# Patient Record
Sex: FEMALE | Race: WHITE | Employment: FULL TIME | URBAN - METROPOLITAN AREA
[De-identification: names, ages, dates, MRNs, and addresses within clinical notes are randomized per-mention and may not be internally consistent; named-entity substitution may affect disease eponyms.]

---

## 2022-11-15 ENCOUNTER — OFFICE VISIT (OUTPATIENT)
Dept: PRIMARY CARE CLINIC | Age: 37
End: 2022-11-15
Payer: COMMERCIAL

## 2022-11-15 VITALS
DIASTOLIC BLOOD PRESSURE: 76 MMHG | HEIGHT: 60 IN | SYSTOLIC BLOOD PRESSURE: 120 MMHG | TEMPERATURE: 98 F | BODY MASS INDEX: 26.7 KG/M2 | OXYGEN SATURATION: 98 % | HEART RATE: 70 BPM | WEIGHT: 136 LBS

## 2022-11-15 DIAGNOSIS — Z00.00 HEALTH MAINTENANCE EXAMINATION: Primary | ICD-10-CM

## 2022-11-15 DIAGNOSIS — D49.3 BREAST NEOPLASM: ICD-10-CM

## 2022-11-15 DIAGNOSIS — L50.3 DERMATOGRAPHIA: ICD-10-CM

## 2022-11-15 DIAGNOSIS — F41.9 ANXIETY: ICD-10-CM

## 2022-11-15 DIAGNOSIS — R06.02 SHORTNESS OF BREATH: ICD-10-CM

## 2022-11-15 DIAGNOSIS — R10.13 DYSPEPSIA: ICD-10-CM

## 2022-11-15 DIAGNOSIS — D35.2 PROLACTINOMA (HCC): ICD-10-CM

## 2022-11-15 PROCEDURE — 99214 OFFICE O/P EST MOD 30 MIN: CPT | Performed by: FAMILY MEDICINE

## 2022-11-15 PROCEDURE — 99395 PREV VISIT EST AGE 18-39: CPT | Performed by: FAMILY MEDICINE

## 2022-11-15 RX ORDER — ALBUTEROL SULFATE 90 UG/1
AEROSOL, METERED RESPIRATORY (INHALATION)
Qty: 18 G | Refills: 0 | Status: SHIPPED | OUTPATIENT
Start: 2022-11-15

## 2022-11-15 ASSESSMENT — PATIENT HEALTH QUESTIONNAIRE - PHQ9
2. FEELING DOWN, DEPRESSED OR HOPELESS: 0
1. LITTLE INTEREST OR PLEASURE IN DOING THINGS: 0
SUM OF ALL RESPONSES TO PHQ QUESTIONS 1-9: 0
SUM OF ALL RESPONSES TO PHQ QUESTIONS 1-9: 0
SUM OF ALL RESPONSES TO PHQ9 QUESTIONS 1 & 2: 0
SUM OF ALL RESPONSES TO PHQ QUESTIONS 1-9: 0
SUM OF ALL RESPONSES TO PHQ QUESTIONS 1-9: 0

## 2022-11-15 NOTE — PROGRESS NOTES
Cherri Villagomez : 1985 Sex: female  Age: 44 y.o. Chief Complaint   Patient presents with    Annual Exam       HPI  HPI:     Donnie Page presents today for annual physical and multiple other things she would like to discuss, last seen many years ago, greater than 3. She does follow with her endocrinologist regarding her prolactinoma. Had blood work in regard to this recently. Generally feels well. Brings up intermittent symptoms of what sounds like dermatographia. Chronic nasal congestion. About twice a month gets an acid feeling in her stomach that makes sleep difficult. Chronic anxiety but does not wish intervention. Functions very well, absolutely no SI/HI. Not in favor of medications in general.  Also says every so often she feels like she needs to catch her breath. History of early asthma. Extensive cardiac work-up through [email or web address] was negative and defers other cardiac testing including EKG but would like chest x-ray. Limitations this compared to advanced imaging reviewed and also discussed role of PFTs etc.  Would like a trial inhaler. She will get blood work and check with insurance first.  Online doctor prescribed what sounds like a high potency topical ointment for the dermatographia that does work, but we did emphasize importance of using it no more than 2 weeks with at least 1 week off on very small areas. Also history of multiple breast tumors that she states were benign, does not want mammogram, but would like follow-up ultrasound at [facility]. Used to see Dr. Bogdan Amezquita, needs new gynecologist encouraged her to follow-up ASAP. Also gas buildup at times. Health Maintenance:  Proper diet reviewed including Mediterranean and DASH diets. Counseled on healthy weight, appropriate exercise, avoidance of tobacco, and recommendations for minimal to no alcohol consumption. Counseled on the potential pros and cons of vitamins and supplements.   Reviewed the recommendations and risk/benefits of vaccines including phizer x 2; Td/Tdap (Declines) ,  pneumovax,  prevnar 13 , flu vaccine  (Declines) , Hepatitis vaccines, gardasil (Declines) , and shingrix (patient had chicken pox). HIV and Hep C screening guidelines were reviewed. Importance of regular eye and dental exams and health reviewed. Risks/Benefits of ASA reviewed and discussed latest guidelines. Sun protection reviewed. Notify if any new or changing moles/skin lesions, etc. Dexa Scan indications/ risk factors for osteoporotic fractures and prevention reviewed. Colonoscopy recommendations reviewed. Pt denies change in bowel habits or [address] of colon polyps/CA. Fit test discussed    Indications for EKG and  for additional  cardiac testing including referrals, stress testing,  2d echo, ect. dasx. Reviewed indications for other testing such as  PFT's.and  indications for imaging including brain, carotid, chest, abdominal, aortic .  dasx. Counseled on instructions regarding, and importance of monthly breast exams, yearly physician exams (sooner if sx's). Indications for mammograms reviewed and importance. Dexa Scan indications/ risk factors for osteoporotic fractures (and associated M/M) and preventative measures reviewed. Importance of regular GYN exams reviewed and pt to follow here or with her gynecologist as directed. Behind on GYN, encouraged      Review of Systems  ROS:  Const: Denies chills, fever, malaise and sweats. Eyes: Denies discharge, pain, redness and visual disturbance. ENMT: Denies earaches, other ear symptoms. Denies nasal or sinus symptoms other than stated  above. Denies mouth and tongue lesions and sore throat. CV: Denies chest discomfort, pain; diaphoresis, dizziness, edema, lightheadedness, orthopnea,  palpitations, syncope and near syncopal episode or any exertional symptoms  Resp: Denies cough, hemoptysis, pleuritic pain, SOB, sputum production and wheezing.   GI: Denies abdominal pain, change in bowel habits, hematochezia, melena, nausea and vomiting. Other than as above  : Denies urinary symptoms including dysuria , urgency, frequency or hematuria. Musculo: Denies musculoskeletal symptoms. Skin: Denies bruising and rash. Other than as above  Neuro: Denies headache, numbness, stiff neck, tingling and focal weakness slurred speech or facial  droop  Hema/Lymph: Denies bleeding/bruising tendency and enlarged lymph nodes        Current Outpatient Medications:     albuterol sulfate HFA (PROVENTIL;VENTOLIN;PROAIR) 108 (90 Base) MCG/ACT inhaler, 1-2 puffs q4-6 hrs prn, Disp: 18 g, Rfl: 0    cabergoline (DOSTINEX) 0.5 MG tablet, Take 0.25 mg by mouth twice a week., Disp: , Rfl:   Allergies   Allergen Reactions    Bactrim Nausea And Vomiting    Pcn [Penicillins] Rash       Past Medical History:   Diagnosis Date    Prolactinoma ([facility] [address]      Past Surgical History:   Procedure Laterality Date    COLONOSCOPY      HYSTERECTOMY, TOTAL ABDOMINAL (CERVIX REMOVED)      ovaries remain    SALPINGECTOMY      SHOULDER SURGERY      UPPER GASTROINTESTINAL ENDOSCOPY  04/23/2012    : EGD/COLONOSCOPY BOTH NORMAL    WISDOM TOOTH EXTRACTION       Family History   Problem Relation Age of Onset    Other Mother         mental health    Other Father         lives [place], alcoholic     Social History     Tobacco Use    Smoking status: Never    Smokeless tobacco: Never   Substance Use Topics    Alcohol use: No    Drug use: Yes     Types: Marijuana Destinee Coad)      Social History     Social History Narrative    Single, no kids        Works [employer]        Vitals:    11/15/22 0814   BP: 120/76   Pulse: 70   Temp: 98 °F (36.7 °C)   SpO2: 98%   Weight: 136 lb (61.7 kg)   Height: 5' (1.524 m)      Wt Readings from Last 3 Encounters:   11/15/22 136 lb (61.7 kg)   11/20/16 125 lb (56.7 kg)   08/02/16 125 lb (56.7 kg)        Physical Exam  Exam:  Const: Appears comfortable. No signs of acute distress present.   Head/Face: Atraumatic on inspection. Eyes: EOMI in both eyes. No discharge from the eyes. PERRL. Sclerae clear. ENMT: Auditory canals normal. Tympanic membranes: intact and translucent. External nose WNL. Nasal mucosa is clear. Oropharynx: No erythema or exudate. Posterior pharynx is normal.  Neck: Supple. Palpation reveals no adenopathy. No masses appreciated. No JVD. Carotids: no  bruits. Resp: Respirations are unlabored. Clear to auscultation. No rales, rhonchi or wheezes appreciated  over the lungs bilaterally. CV: Rate is regular. Rhythm is regular. No gallop or rubs. No heart murmur appreciated. Extremities: No clubbing, cyanosis, or edema. No calf inflammation or tenderness. Abdomen: Bowel sounds are normoactive. Abdomen is soft, nontender, and nondistended. No  abdominal masses. No palpable hepatosplenomegaly. Lymph: No palpable or visible regional lymphadenopathy. Musculoskeletal: no acute joint inflammation. Skin: Dry and warm with no rash. Skin normal to inspection and palpation overall. Evidence dermatographia  Neuro: Alert and oriented. Affect: appropriate. Upper Extremities: 5/5 bilaterally. Lower Extremities:  5/5 bilaterally. Sensation intact to light touch. Reflexes: DTR's are symmetric and 2+ bilaterally. .  Cranial Nerves: Cranial nerves grossly intact. Breast-defers GYN    Office Labs This Visit :  No results found for this visit on 11/15/22. Assessment and Plan:   Diagnosis Orders   1. Health maintenance examination  Lipid Panel    TSH    Comprehensive Metabolic Panel    CBC with Auto Differential    Urinalysis      2. Breast neoplasm  US BREAST COMPLETE RIGHT    US BREAST COMPLETE LEFT      3. Dyspepsia  H. pylori antibody, IgG      4. Anxiety        5. Prolactinoma ([address]        6. Dermatographia        7.  Shortness of breath  XR CHEST (2 VW)    albuterol sulfate HFA (PROVENTIL;VENTOLIN;PROAIR) 108 (90 Base) MCG/ACT inhaler          No problem-specific Assessment & Plan notes found for this encounter. 1. Health maintenance examination  Health maintenance issues discussed at length as above, 11/15/2022 . Encouraged yearly physicals. - Lipid Panel; Future  - TSH; Future  - Comprehensive Metabolic Panel; Future  - CBC with Auto Differential; Future  - Urinalysis; Future    2. Breast neoplasm  Chronic, multiple bilateral lesions that she states are benign, not sure if they are solid versus cystic-she states she would like follow-up ultrasounds, mammogram or other imaging. Should follow-up with GYN ASAP. Agreed to ultrasound. - US BREAST COMPLETE RIGHT; Future  - US BREAST COMPLETE LEFT; Future    3. Dyspepsia  Counseled extensively. Differential reviewed, including serious etiologies. Small frequent meals. Counseled on trial of lactose-free gluten-free diet. Counseled on probiotic. Counseled low FODMAP diet. Avoid eating close to bedtime. Counseled on use of Pepcid. Check blood work including H. pylori. Defers otherwise.  - H. pylori antibody, IgG; Future    4. Anxiety  Chronic, states manages well, counseling encouraged and she states has counselor, absolutely no SI/HI, not interested in meds    5. Prolactinoma ([facility] [address]  Continue per endocrinologist and up-to-date with this and gets blood work through them. 6. Dermatographia  Counseled extensively. Differential reviewed, including serious etiologies. Consider allergy testing, consider dermatology including Dr. Clara Gutierrez or Dr. Luciana Nogueira. She has topical steroid ointment to use sparingly as directed as needed, may try Allegra 180 mg once or twice a day +/- Pepcid 20 mg once or twice a day. Avoid hot showers, avoid allergy triggers. Notify if continues or worsens    7. Shortness of breath  Chronic where she has to take a deep breath every once in a while. Has early asthma she states.   Would like chest x-ray, defers cardiac testing, states had extensive testing through [facility] clinic or other pulmonary testing this time including advanced imaging or PFTs. Trial of albuterol as directed as needed. Again check chest x-ray. - XR CHEST (2 VW); Future  - albuterol sulfate HFA (PROVENTIL;VENTOLIN;PROAIR) 108 (90 Base) MCG/ACT inhaler; 1-2 puffs q4-6 hrs prn  Dispense: 18 g; Refill: 0      No flowsheet data found. Plan as above. Counseled extensively and differential diagnoses relevant to above were reviewed, including serious etiologies, risks and complications, especially of left uncontrolled. If relevant, instructions and  alternatives to meds/treatment reviewed, as well as interactions, and  SE's/ADRs reviewed, notify immediately if any, discontinuing new meds if any. Plan made after discussion and shared decision making. Full health maintenance issues addressed as above as well as other medical concerns and diagnoses as above at today's visit. Counseled and wrote down recommendations to consider Allegra 180 mg 1-2 a day +/- Pepcid 20 mg 1-2 a day as needed. She does have a topical steroid to use sparingly as directed as needed. Coolmist vaporizer. Avoid allergy triggers. Consider allergy testing. Consider dermatology in recommended either Dr. Asia Eason or Dr. Nickolas Fierro. See GYN. Check breast ultrasound. Small frequent meals. Minimize eating close to bed. Try lactose-free gluten-free diet. Probiotic. Counseled on low FODMAP diet. As long as symptoms steadily improve/resolve, and medical conditions follow the expected course, FU as below, sooner PRN. Return in about 2 weeks (around 11/29/2022), or if symptoms worsen or fail to improve. Educational materials and/or home exercises printed for patient's review and were included in patient instructions on his/her After Visit Summary and given to patient at the end of visit. After discussion, patient and/or guardian verbalizes understanding, agrees, feels comfortable with and wishes to proceed with above treatment plan.  Call for any pending results, FU sooner if abnormal, as needed or if any current symptoms persist/worsen. Advised patient to call with any new medication issues, and read all Rx info from pharmacy to assure aware of all possible risks and side effects of medication before taking. Reviewed age and gender appropriate health screening exams and vaccinations. Advised patient regarding importance of keeping up with recommended health maintenance and to schedule as soon as possible if overdue, as this is important in assessing for undiagnosed pathology, especially cancer, as well as protecting against potentially harmful/life threatening disease. Patient and/or guardian verbalizes understanding and agrees with above counseling, assessment and plan. All questions answered. Signs and symptoms to watch for discussed, serious signs and symptoms reviewed. ER if any. Humberto Robbins MD    Patients are advised to check with insurance company to ensure coverage and to fully understand benefits and cost prior to any testing. This note was created with the assistance of voice recognition software. Document was reviewed however may contain grammatical errors.

## 2022-12-07 ENCOUNTER — TELEPHONE (OUTPATIENT)
Dept: PRIMARY CARE CLINIC | Age: 37
End: 2022-12-07

## 2022-12-07 NOTE — TELEPHONE ENCOUNTER
The pt is calling to have the urinalysis and H pylori blood tests cancelled because her insurance is not going to cover them

## 2022-12-22 DIAGNOSIS — D49.3 BREAST NEOPLASM: ICD-10-CM

## 2022-12-22 NOTE — RESULT ENCOUNTER NOTE
Again, ultrasound was read as benign and unchanged from 2018, which is good. Ultrasonographically even the most complicated cyst was felt to be benign. However it is always safest to follow with gynecologist for breast issues and even safer to also follow with a breast surgeon, although again from an ultrasound standpoint felt to be benign and unchanged.   Again, I always recommend follow-up here as well for further discussion, at least virtual

## 2022-12-22 NOTE — RESULT ENCOUNTER NOTE
Ultrasound read as benign and unchanged from 2018. However it is mentioned that there are cysts both simple and complicated \"too numerous to count\"-it is important that she is appropriately following with her gynecologist for this and order a breast surgeon to be safe. Follow-up here as directed as well.

## 2022-12-23 ENCOUNTER — TELEPHONE (OUTPATIENT)
Dept: PRIMARY CARE CLINIC | Age: 37
End: 2022-12-23

## 2022-12-23 NOTE — TELEPHONE ENCOUNTER
Patient is c/o a deep cough that started about 3d ago, had a tickle in her throat and a stuffy nose. When she blew her nose today she said her R eardrum really started hurting.  Advised she should come to express but she now lives in [place] and is unable to come to the office as she is 45 mins away

## 2022-12-23 NOTE — TELEPHONE ENCOUNTER
Standard precautions/recommendations, but Important to be seen and evaluated, difficult to accurately diagnose and tx otherwise.  There are various express cares around; if not convenient, would recommend UC near her house

## 2022-12-29 ENCOUNTER — TELEPHONE (OUTPATIENT)
Dept: PRIMARY CARE CLINIC | Age: 37
End: 2022-12-29

## 2022-12-29 DIAGNOSIS — Z00.00 HEALTH MAINTENANCE EXAMINATION: ICD-10-CM

## 2022-12-29 DIAGNOSIS — D35.2 PROLACTINOMA (HCC): Primary | ICD-10-CM

## 2022-12-29 NOTE — TELEPHONE ENCOUNTER
First this is the absolute first time I am hearing anything about this so I'm not sure where the delay is either. Second, I'm not sure why specialist isn't ordering if they want it. But I just added it to our orders. As always, advise pt to ck with insurance prior to lab draw to ensure insurance will cover as ordered.

## 2022-12-29 NOTE — TELEPHONE ENCOUNTER
Patient wanting to come in tomorrow. [employer] advised patient they called our office about adding Prolactin to the recent blood work that she already has ordered. I do not see anything in the chart, but needs to have this done tomorrow as she will haven no insurance beginning of the year. Has a diagnosis Prolactinoma     Very anxious because its already 4pm and she does not know what this has not been added when she has been told by Parris Sheffield that someone reached out to our office and it was done and added.      Wanting to come at 7am tomorrow     Patient needs a call back so she is aware

## 2022-12-30 DIAGNOSIS — D35.2 PROLACTINOMA (HCC): ICD-10-CM

## 2022-12-30 DIAGNOSIS — Z00.00 HEALTH MAINTENANCE EXAMINATION: ICD-10-CM

## 2022-12-30 DIAGNOSIS — R31.9 HEMATURIA, UNSPECIFIED TYPE: Primary | ICD-10-CM

## 2022-12-30 LAB
ALBUMIN SERPL-MCNC: 4.5 G/DL (ref 3.5–5.2)
ALP BLD-CCNC: 51 U/L (ref 35–104)
ALT SERPL-CCNC: <5 U/L (ref 0–32)
AMORPHOUS: ABNORMAL
ANION GAP SERPL CALCULATED.3IONS-SCNC: 16 MMOL/L (ref 7–16)
AST SERPL-CCNC: 17 U/L (ref 0–31)
BACTERIA: ABNORMAL /HPF
BASOPHILS ABSOLUTE: 0.03 E9/L (ref 0–0.2)
BASOPHILS RELATIVE PERCENT: 0.6 % (ref 0–2)
BILIRUB SERPL-MCNC: 0.4 MG/DL (ref 0–1.2)
BILIRUBIN URINE: NEGATIVE
BLOOD, URINE: ABNORMAL
BUN BLDV-MCNC: 10 MG/DL (ref 6–20)
CALCIUM SERPL-MCNC: 9.2 MG/DL (ref 8.6–10.2)
CHLORIDE BLD-SCNC: 102 MMOL/L (ref 98–107)
CHOLESTEROL, TOTAL: 209 MG/DL (ref 0–199)
CLARITY: ABNORMAL
CO2: 22 MMOL/L (ref 22–29)
COLOR: YELLOW
CREAT SERPL-MCNC: 0.7 MG/DL (ref 0.5–1)
EOSINOPHILS ABSOLUTE: 0.2 E9/L (ref 0.05–0.5)
EOSINOPHILS RELATIVE PERCENT: 4.1 % (ref 0–6)
EPITHELIAL CELLS, UA: ABNORMAL /HPF
GFR SERPL CREATININE-BSD FRML MDRD: >60 ML/MIN/1.73
GLUCOSE BLD-MCNC: 88 MG/DL (ref 74–99)
GLUCOSE URINE: NEGATIVE MG/DL
HCT VFR BLD CALC: 37.2 % (ref 34–48)
HDLC SERPL-MCNC: 55 MG/DL
HEMOGLOBIN: 12.3 G/DL (ref 11.5–15.5)
IMMATURE GRANULOCYTES #: 0.04 E9/L
IMMATURE GRANULOCYTES %: 0.8 % (ref 0–5)
KETONES, URINE: NEGATIVE MG/DL
LDL CHOLESTEROL CALCULATED: 138 MG/DL (ref 0–99)
LEUKOCYTE ESTERASE, URINE: ABNORMAL
LYMPHOCYTES ABSOLUTE: 1.98 E9/L (ref 1.5–4)
LYMPHOCYTES RELATIVE PERCENT: 40.4 % (ref 20–42)
MCH RBC QN AUTO: 29.7 PG (ref 26–35)
MCHC RBC AUTO-ENTMCNC: 33.1 % (ref 32–34.5)
MCV RBC AUTO: 89.9 FL (ref 80–99.9)
MONOCYTES ABSOLUTE: 0.37 E9/L (ref 0.1–0.95)
MONOCYTES RELATIVE PERCENT: 7.6 % (ref 2–12)
NEUTROPHILS ABSOLUTE: 2.28 E9/L (ref 1.8–7.3)
NEUTROPHILS RELATIVE PERCENT: 46.5 % (ref 43–80)
NITRITE, URINE: NEGATIVE
PDW BLD-RTO: 12.2 FL (ref 11.5–15)
PH UA: 6 (ref 5–9)
PLATELET # BLD: 307 E9/L (ref 130–450)
PMV BLD AUTO: 9.9 FL (ref 7–12)
POTASSIUM SERPL-SCNC: 4 MMOL/L (ref 3.5–5)
PROLACTIN: 0.36 NG/ML
PROLACTIN: 0.36 NG/ML
PROTEIN UA: ABNORMAL MG/DL
RBC # BLD: 4.14 E12/L (ref 3.5–5.5)
RBC UA: ABNORMAL /HPF (ref 0–2)
SODIUM BLD-SCNC: 140 MMOL/L (ref 132–146)
SPECIFIC GRAVITY UA: >=1.03 (ref 1–1.03)
TOTAL PROTEIN: 7.4 G/DL (ref 6.4–8.3)
TRIGL SERPL-MCNC: 80 MG/DL (ref 0–149)
TSH SERPL DL<=0.05 MIU/L-ACNC: 0.73 UIU/ML (ref 0.27–4.2)
UROBILINOGEN, URINE: 0.2 E.U./DL
VLDLC SERPL CALC-MCNC: 16 MG/DL
WBC # BLD: 4.9 E9/L (ref 4.5–11.5)
WBC UA: ABNORMAL /HPF (ref 0–5)

## 2022-12-30 NOTE — RESULT ENCOUNTER NOTE
Microscopic blood in urine. Check if was menstruating? Repeat 2 weeks sooner if symptoms. Follow-up to review more detail and to further assess. CBC with differential normal.  Other blood work pending.

## 2022-12-30 NOTE — RESULT ENCOUNTER NOTE
See above message.  In addition to that, prolactin 0.36 which is low.  Make sure physician who requested this receives a copy.  Her LDL (bad cholesterol) mildly elevated at 138 but HDL (good cholesterol) is good at 55.

## 2023-12-29 ENCOUNTER — TELEPHONE (OUTPATIENT)
Dept: ENDOCRINOLOGY | Facility: CLINIC | Age: 38
End: 2023-12-29
Payer: COMMERCIAL

## 2023-12-29 DIAGNOSIS — D35.2 PROLACTINOMA (MULTI): ICD-10-CM

## 2023-12-29 NOTE — TELEPHONE ENCOUNTER
Patient requesting virtual appointment , no labs for the last year , pending labs to provider to consider placing . Elsy Dobson LPN

## 2024-01-02 ENCOUNTER — TELEMEDICINE (OUTPATIENT)
Dept: ENDOCRINOLOGY | Facility: CLINIC | Age: 39
End: 2024-01-02
Payer: COMMERCIAL

## 2024-01-19 ENCOUNTER — TELEMEDICINE (OUTPATIENT)
Dept: ENDOCRINOLOGY | Facility: CLINIC | Age: 39
End: 2024-01-19
Payer: COMMERCIAL

## 2024-01-19 DIAGNOSIS — D35.2 PROLACTINOMA (MULTI): Primary | ICD-10-CM

## 2024-01-19 PROCEDURE — 99213 OFFICE O/P EST LOW 20 MIN: CPT | Performed by: INTERNAL MEDICINE

## 2024-01-19 RX ORDER — CABERGOLINE 0.5 MG/1
0.5 TABLET ORAL 2 TIMES WEEKLY
COMMUNITY
Start: 2019-10-17

## 2024-01-19 RX ORDER — METFORMIN HYDROCHLORIDE 500 MG/1
500 TABLET ORAL
Qty: 60 TABLET | Refills: 11 | Status: SHIPPED | OUTPATIENT
Start: 2024-01-19 | End: 2025-01-18

## 2024-01-19 NOTE — PROGRESS NOTES
Consults    Reason For Consult  Hyperprolactinemia     History Of Present Illness  Melyssa Howe is a 38 y.o. female presenting with high prolactin .       20 lbs weight gained  She feels concerned about this     She has headaches and eyes gets irritated  , and needs accomodation from bright light  , bright light cover          brief history:  she has    for prolactinoma breast tenderness  She had a very mild elevation of prolactin but very significant clinical findings that responded to cabergoline  She tolerates her dose  She is due for another MRI in a year  She has episodes of nausea dizziness and lightheadedness which requires blood testing to adjust her medications  She is currently working from home  I have not seen her for more than 1-1/2-year so she wanted to establish her care so that she can get her medications and imaging as well as her test           Any family history of thyroid cancer? no  Any personal history of radiation to your head/neck? no    Past Medical History  She has a past medical history of Benign neoplasm of pituitary gland (CMS/HCC) (12/13/2022) and Migraine, unspecified, not intractable, without status migrainosus.    Surgical History  She has a past surgical history that includes Other surgical history (11/24/2020); Other surgical history (11/24/2020); Other surgical history (11/24/2020); Other surgical history (11/24/2020); Pituitary surgery; [place] tooth extraction; and Hysterectomy.     Social History  She reports that she has never smoked. She has never used smokeless tobacco. She reports that she does not drink alcohol and does not use drugs.    Family History  Family History   Problem Relation Name Age of Onset    Osteoporosis Mother Janett     Rheum arthritis Mother Janett     Breast cancer Maternal Grandmother Ziggy     Lung cancer Maternal Grandmother Ziggy     Stroke Maternal Grandmother Ziggy         Allergies  Penicillins, Amaris (28) [drospirenone-ethinyl estradiol], Sulfamethoxazole,  "and Sulfamethoxazole-trimethoprim    Review of Systems    Past Medical History:   Diagnosis Date    Benign neoplasm of pituitary gland (CMS/[facility]) 12/13/2022    Prolactinoma    Migraine, unspecified, not intractable, without status migrainosus     Migraine       Past Surgical History:   Procedure Laterality Date    HYSTERECTOMY      OTHER SURGICAL HISTORY  11/24/2020    Hysterectomy    OTHER SURGICAL HISTORY  11/24/2020    Colonoscopy    OTHER SURGICAL HISTORY  11/24/2020    Endoscopy    OTHER SURGICAL HISTORY  11/24/2020    Shoulder surgery    PITUITARY SURGERY      WISDOM TOOTH EXTRACTION         Social History     Socioeconomic History    Marital status: Single     Spouse name: Not on file    Number of children: Not on file    Years of education: Not on file    Highest education level: Not on file   Occupational History    Not on file   Tobacco Use    Smoking status: Never    Smokeless tobacco: Never   Substance and Sexual Activity    Alcohol use: Never    Drug use: Never    Sexual activity: Yes     Partners: Male     Comment: Hysterectomy   Other Topics Concern    Not on file   Social History Narrative    Not on file     Social Determinants of Health     Financial Resource Strain: Not on file   Food Insecurity: Not on file   Transportation Needs: Not on file   Physical Activity: Not on file   Stress: Not on file   Social Connections: Not on file   Intimate Partner Violence: Not on file   Housing Stability: Not on file        Physical Exam     ROS, PMH, FH/SH, surgical history and allergies have been reviewed.    Last Recorded Vitals  There were no vitals taken for this visit.    Relevant Results         If you would like to pull in Medications, type .meds     If you would like to pull in Lab results for the last 24 hours, type .[id number]    If you would like to pull in specific Lab results, type .ll     If you would like to pull in Imaging results, type .imgrslt :99}  Lab Review  No results found for: \"BILITOT\", " "\"CALCIUM\", \"CO2\", \"CL\", \"CREATININE\", \"GLUCOSE\", \"ALKPHOS\", \"K\", \"PROT\", \"NA\", \"AST\", \"ALT\", \"BUN\", \"ANIONGAP\", \"MG\", \"PHOS\", \"GGT\", \"LDH\", \"ALBUMIN\", \"AMYLASE\", \"LIPASE\", \"GFRF\", \"GFRMALE\"  No results found for: \"TRIG\", \"CHOL\", \"LDLCALC\", \"HDL\"  No results found for: \"HGBA1C\"  The ASCVD Risk score (Craig ELIZABETH, et al., 2019) failed to calculate for the following reasons:    The 2019 ASCVD risk score is only valid for ages 40 to 79       Assessment/Plan   Problem List Items Addressed This Visit    None  Visit Diagnoses         Codes    Prolactinoma (CMS/[facility])    -  Primary D35.2    Relevant Medications    metFORMIN (Glucophage) 500 mg tablet    Other Relevant Orders    DHEA-Sulfate    Testosterone,Free and Total    FSH & LH    Estradiol Free and Total                   Prolactinoma with significant breast tenderness and galactorrhea without treatment     In 2022 did do heart monitoring and no event was recorded  she has nausea with the cabergoline , takes it at night      Requires 2 to 3 tablets a week cabergoline to control her symptoms  No recent prolactin levels noted  Her last MRI for her microadenoma was 2022  1.  Normal pituitary gland.   2.  Unchanged small focus of T2 and FLAIR hyperintensity within the left   subinsular white matter as could represent sequela of migraine headache   or prior insult.     We decided to do it every 2 to 3 years,    We will for now continue cabergoline as she is tolerating it although she does have episodes of migraines with nausea dizziness and tachycardia   for her wt gain hisutism and new onset hot flushes, I will r/o menopause, her hysterectomy might have push he menopause to an earlier age      I am going to order blood test that they are done a    I spent a total of 30+ minutes on the date of the service which included preparing to see the patient, face-to-face patient care, completing clinical documentation, obtaining and / or reviewing separately obtained history, " counseling and educating the patient/family/caregiver, ordering medications, tests, or procedures, communicating with other healthcare providers (not separately reported), independently interpreting results, not separately reported, and communicating results to the patient/family/caregiver, real-time telemedicine visit using audiovisual technology with patient's verbal consent.  Name and date of birth verified.      Narciso Eisenberg MD

## 2024-02-13 ENCOUNTER — TELEPHONE (OUTPATIENT)
Dept: ENDOCRINOLOGY | Facility: CLINIC | Age: 39
End: 2024-02-13
Payer: COMMERCIAL

## 2024-02-13 NOTE — TELEPHONE ENCOUNTER
Patient requesting labs faxed to [facility] at [phone]. Labs faxed as requested. Elsy Dobson LPN

## 2024-03-12 ENCOUNTER — TELEPHONE (OUTPATIENT)
Dept: ENDOCRINOLOGY | Facility: CLINIC | Age: 39
End: 2024-03-12
Payer: COMMERCIAL

## 2024-03-12 NOTE — TELEPHONE ENCOUNTER
Lab results received and available for review from [facility] . Prolactin 0.3 routing to provider to review . Elsy Dobson LPN'

## 2024-05-06 ENCOUNTER — TELEPHONE (OUTPATIENT)
Dept: ENDOCRINOLOGY | Facility: CLINIC | Age: 39
End: 2024-05-06
Payer: COMMERCIAL

## 2024-05-15 ENCOUNTER — TELEPHONE (OUTPATIENT)
Dept: ENDOCRINOLOGY | Facility: CLINIC | Age: 39
End: 2024-05-15
Payer: COMMERCIAL

## 2024-05-15 NOTE — TELEPHONE ENCOUNTER
[facility] services contacted office , Lab for estradiol free and total was cancelled due to improper processing . Lab did not process the blood in the tube correctly . Routing to provider to see if she would like it redrawn . Elsy Dobson LPN

## 2024-06-10 ENCOUNTER — PATIENT MESSAGE (OUTPATIENT)
Dept: ENDOCRINOLOGY | Facility: CLINIC | Age: 39
End: 2024-06-10
Payer: COMMERCIAL

## 2024-06-10 NOTE — TELEPHONE ENCOUNTER
From: Melyssa Howe  To: Narciso Eisenberg  Sent: 6/10/2024 9:59 AM EDT  Subject: Blood test results    Elsy said you'd reach out about my blood test results from 3/11 and then there was a second date too because all the orders weren't ran the first time. Can you tell me how everything came out please? I hope this message finds you and your well :)

## 2024-09-13 DIAGNOSIS — D35.2 PROLACTINOMA (MULTI): Primary | ICD-10-CM

## 2024-10-08 ENCOUNTER — TELEPHONE (OUTPATIENT)
Dept: PRIMARY CARE CLINIC | Age: 39
End: 2024-10-08

## 2024-10-08 NOTE — TELEPHONE ENCOUNTER
Looks like  last seen 11/2022, almost 2 years ago.  Guidelines necessitate a visit for me to order testing.  Should be following with gynecologist as well.  Very important these conditions are followed up on, please schedule ASAP

## 2024-10-08 NOTE — TELEPHONE ENCOUNTER
Patient calling. States that she gets a breast US twice a year. Due for next one. Usually uses [facility].

## 2024-10-15 ENCOUNTER — TELEMEDICINE (OUTPATIENT)
Dept: PRIMARY CARE CLINIC | Age: 39
End: 2024-10-15
Payer: COMMERCIAL

## 2024-10-15 DIAGNOSIS — N60.02 BILATERAL BREAST CYSTS: ICD-10-CM

## 2024-10-15 DIAGNOSIS — Z00.00 HEALTH MAINTENANCE EXAMINATION: ICD-10-CM

## 2024-10-15 DIAGNOSIS — N60.01 BILATERAL BREAST CYSTS: ICD-10-CM

## 2024-10-15 DIAGNOSIS — D35.2 PROLACTINOMA (HCC): Primary | ICD-10-CM

## 2024-10-15 DIAGNOSIS — E78.2 MIXED HYPERLIPIDEMIA: ICD-10-CM

## 2024-10-15 PROCEDURE — 99214 OFFICE O/P EST MOD 30 MIN: CPT | Performed by: FAMILY MEDICINE

## 2024-10-15 SDOH — ECONOMIC STABILITY: FOOD INSECURITY: WITHIN THE PAST 12 MONTHS, THE FOOD YOU BOUGHT JUST DIDN'T LAST AND YOU DIDN'T HAVE MONEY TO GET MORE.: PATIENT DECLINED

## 2024-10-15 SDOH — ECONOMIC STABILITY: INCOME INSECURITY: HOW HARD IS IT FOR YOU TO PAY FOR THE VERY BASICS LIKE FOOD, HOUSING, MEDICAL CARE, AND HEATING?: PATIENT DECLINED

## 2024-10-15 SDOH — ECONOMIC STABILITY: FOOD INSECURITY: WITHIN THE PAST 12 MONTHS, YOU WORRIED THAT YOUR FOOD WOULD RUN OUT BEFORE YOU GOT MONEY TO BUY MORE.: PATIENT DECLINED

## 2024-10-15 ASSESSMENT — PATIENT HEALTH QUESTIONNAIRE - PHQ9
SUM OF ALL RESPONSES TO PHQ QUESTIONS 1-9: 0
2. FEELING DOWN, DEPRESSED OR HOPELESS: NOT AT ALL
SUM OF ALL RESPONSES TO PHQ QUESTIONS 1-9: 0
1. LITTLE INTEREST OR PLEASURE IN DOING THINGS: NOT AT ALL
SUM OF ALL RESPONSES TO PHQ QUESTIONS 1-9: 0
SUM OF ALL RESPONSES TO PHQ9 QUESTIONS 1 & 2: 0
SUM OF ALL RESPONSES TO PHQ QUESTIONS 1-9: 0

## 2024-10-15 NOTE — PROGRESS NOTES
visualized signs of difficulty breathing or respiratory distress        [] Abnormal-      Musculoskeletal:   [x] Normal gait with no signs of ataxia         [x] Normal range of motion of neck        [] Abnormal-       Neurological:        [x] No Facial Asymmetry (Cranial nerve 7 motor function) (limited exam to video visit)          [x] No gaze palsy        [] Abnormal-         Skin:        [x] No significant exanthematous lesions or discoloration noted on facial skin         [] Abnormal-            Psychiatric:       [x] Normal Affect [x] No Hallucinations        [] Abnormal-     Other pertinent observable physical exam findings-     ASSESSMENT/PLAN:   Diagnosis Orders   1. Prolactinoma (HCC)        2. Bilateral breast cysts  US BREAST COMPLETE RIGHT    US BREAST COMPLETE LEFT    SANDRA NIKO DIGITAL DIAGNOSTIC BILATERAL PER PROTOCOL      3. Mixed hyperlipidemia        4. Health maintenance examination            No problem-specific Assessment & Plan notes found for this encounter.    Prolactinoma  She follows regularly with endocrinology [facility], on Dostinex, they do regular blood work and they monitor MRIs as well.  She will continue with them for this and other concerns as above    Bilateral breast cysts  History of simple and complicated cysts.  Given this and age we recommend mammogram and ultrasound.  We discussed doing them together versus staggering every 6 months.  She will look into this and decide.  We ordered a diagnostic mammogram and bilateral ultrasound.  We did encourage she see gynecologist as well.  Limitations of virtual exam reviewed.  She does not feel any new breast lumps nodules or otherwise    Hyperlipidemia  Counseled.  March blood work through  reviewed,  HDL 56 triglyceride 92.  Lifestyle modification reviewed, low-cholesterol/low-fat diet including saturated fat less than 20 g.  Not interested in pharmacotherapy.  Precautions reviewed.  Defers follow-up blood work to

## 2024-10-21 ENCOUNTER — PATIENT MESSAGE (OUTPATIENT)
Dept: ENDOCRINOLOGY | Facility: CLINIC | Age: 39
End: 2024-10-21
Payer: COMMERCIAL

## 2024-10-21 NOTE — PATIENT COMMUNICATION
Labs were faxed to [facility] at [facility] [phone].

## 2024-10-29 ENCOUNTER — OFFICE VISIT (OUTPATIENT)
Dept: PRIMARY CARE CLINIC | Age: 39
End: 2024-10-29
Payer: COMMERCIAL

## 2024-10-29 VITALS
HEIGHT: 60 IN | OXYGEN SATURATION: 98 % | WEIGHT: 143 LBS | TEMPERATURE: 98.1 F | SYSTOLIC BLOOD PRESSURE: 120 MMHG | HEART RATE: 99 BPM | BODY MASS INDEX: 28.07 KG/M2 | DIASTOLIC BLOOD PRESSURE: 62 MMHG

## 2024-10-29 DIAGNOSIS — J40 BRONCHITIS: ICD-10-CM

## 2024-10-29 DIAGNOSIS — J04.0 LARYNGITIS: ICD-10-CM

## 2024-10-29 DIAGNOSIS — J01.90 ACUTE BACTERIAL SINUSITIS: Primary | ICD-10-CM

## 2024-10-29 DIAGNOSIS — B96.89 ACUTE BACTERIAL SINUSITIS: Primary | ICD-10-CM

## 2024-10-29 PROCEDURE — 99214 OFFICE O/P EST MOD 30 MIN: CPT | Performed by: FAMILY MEDICINE

## 2024-10-29 RX ORDER — ALBUTEROL SULFATE 90 UG/1
INHALANT RESPIRATORY (INHALATION)
Qty: 18 G | Refills: 0 | Status: SHIPPED | OUTPATIENT
Start: 2024-10-29

## 2024-10-29 RX ORDER — ALBUTEROL SULFATE 90 UG/1
INHALANT RESPIRATORY (INHALATION)
Qty: 18 G | Refills: 0 | Status: SHIPPED | OUTPATIENT
Start: 2024-10-29 | End: 2024-10-29

## 2024-10-29 RX ORDER — CEFDINIR 300 MG/1
300 CAPSULE ORAL 2 TIMES DAILY
Qty: 20 CAPSULE | Refills: 0 | Status: SHIPPED | OUTPATIENT
Start: 2024-10-29 | End: 2024-11-08

## 2024-10-29 RX ORDER — BENZONATATE 100 MG/1
100 CAPSULE ORAL 3 TIMES DAILY PRN
Qty: 20 CAPSULE | Refills: 0 | Status: SHIPPED | OUTPATIENT
Start: 2024-10-29 | End: 2024-10-29

## 2024-10-29 RX ORDER — CEFDINIR 300 MG/1
300 CAPSULE ORAL 2 TIMES DAILY
Qty: 20 CAPSULE | Refills: 0 | Status: SHIPPED | OUTPATIENT
Start: 2024-10-29 | End: 2024-10-29

## 2024-10-29 RX ORDER — BENZONATATE 100 MG/1
100 CAPSULE ORAL 3 TIMES DAILY PRN
Qty: 20 CAPSULE | Refills: 0 | Status: SHIPPED | OUTPATIENT
Start: 2024-10-29 | End: 2024-11-05

## 2024-10-29 NOTE — PROGRESS NOTES
Parisa Chappell : 1985 Sex: female  Age: 39 y.o.    Chief Complaint   Patient presents with    Drainage    Pharyngitis    Cold Symptoms     X 6 days     Ear Pain       HPI  HPI:      Patient presents today with 6 days of URI symptoms.  Has had chills at times but no documented fever.  Notes sinus pressure thick rhinorrhea postnasal drainage laryngitis, thick colored congestion.  Some cough productive at times, paroxysmal.  No chest pain shortness of breath wheezing or specific abdominal complaints pain nausea vomiting change in bowels urinary complaints or otherwise.  She does not believe it is COVID influenza or RSV.  She is quite intolerant to many medications.  Does not tolerate steroids.  Has done research on various conditions, various antibiotics, understands risks and benefits.  She is penicillin allergic.  Has not had any reactions to cephalosporins in the past.  Potential cross reaction reviewed and she understands.  Has had tendon pain with quinolones in the past.  She is taking Mucinex D and we encourage plain Mucinex instead.  She does have a dry nose we counseled on use of nasal saline and saline gel.  She is taking daily elderberry see zinc vitamin D vitamin K.  She was told by someone that some of these vitamins can be harmful.  Of note coworker has similar symptoms and Z-Gennaro not putting a dent in it.  Generally we are conservative with vitamins.  Encourage healthy balanced Mediterranean diet and exercise.  Encourage proper sleep.  Ways to reduce infection risk reviewed.  Generally recommend women's One-A-Day.  Discussed appropriate mild vitamin D supplementation.  In terms of use of elderberry, risk benefits reviewed and understands is a supplement that is not regulated.  Counseled on C/zinc and probiotic and would typically recommend prn    ROS:  As above      Current Outpatient Medications:     albuterol sulfate HFA (PROVENTIL;VENTOLIN;PROAIR) 108 (90 Base) MCG/ACT inhaler, 1-2 puffs q4-6

## 2024-10-30 ENCOUNTER — TELEPHONE (OUTPATIENT)
Dept: PRIMARY CARE CLINIC | Age: 39
End: 2024-10-30

## 2024-10-30 NOTE — TELEPHONE ENCOUNTER
Patient calling. Was seen yesterday and given medication. Wanted to make sure these medications would NOT interact with her brain tumor medication, Cabergoline. Patient said the last time she has medication for the flu, she passed out.     Okay to leave detailed message per patient if she does not answer.

## 2024-11-06 DIAGNOSIS — N60.01 BILATERAL BREAST CYSTS: ICD-10-CM

## 2024-11-06 DIAGNOSIS — N60.02 BILATERAL BREAST CYSTS: ICD-10-CM

## 2024-11-21 ENCOUNTER — TELEPHONE (OUTPATIENT)
Dept: PRIMARY CARE CLINIC | Age: 39
End: 2024-11-21

## 2024-11-21 NOTE — TELEPHONE ENCOUNTER
AMA. Important to be assessed for sxs to know the correct intervention. Unfortunately all antibiotics have known potential risk that are reviewed at the time of prescribing and treatment is agreed upon. Ok for 30 day letter.

## 2024-11-21 NOTE — TELEPHONE ENCOUNTER
With those symptoms needs seen and evaluated to know what the proper therapy is.  C. difficile needs ruled out.  If probiotic instructions allow can temporarily go up to 2 a day or at the max dose of her particular product

## 2024-11-21 NOTE — TELEPHONE ENCOUNTER
Pt advised/says she guesses she'll never come back then because she isn't coming in for something that was the doctor's fault

## 2024-11-21 NOTE — TELEPHONE ENCOUNTER
Patient calling she finished cefdinir 11/7 states her gut has been out of sorts since taking antibiotic. Her gut was perfect before taking antibiotic. She has not had a solid bowel movement since taking antibiotic. She is already taking probiotic. Does not want to schedule an appt just would like script sent in to fix her gut.

## 2025-01-23 ENCOUNTER — TELEPHONE (OUTPATIENT)
Dept: ENDOCRINOLOGY | Facility: CLINIC | Age: 40
End: 2025-01-23
Payer: COMMERCIAL

## 2025-01-23 NOTE — TELEPHONE ENCOUNTER
Patient called and left message that she wanted to schedule an appt with Dr. Castillo. I reached out to patient to inform her that 's schedule is limited due to her having clinic on Thursday half days. I left this message on her voice mail.

## 2025-02-11 ENCOUNTER — APPOINTMENT (OUTPATIENT)
Dept: ENDOCRINOLOGY | Facility: CLINIC | Age: 40
End: 2025-02-11
Payer: COMMERCIAL

## 2025-02-11 VITALS — HEIGHT: 60 IN | BODY MASS INDEX: 29.84 KG/M2 | WEIGHT: 152 LBS

## 2025-02-11 DIAGNOSIS — D35.2 PROLACTINOMA (MULTI): Primary | ICD-10-CM

## 2025-02-11 PROCEDURE — 3008F BODY MASS INDEX DOCD: CPT | Performed by: INTERNAL MEDICINE

## 2025-02-11 PROCEDURE — 1036F TOBACCO NON-USER: CPT | Performed by: INTERNAL MEDICINE

## 2025-02-11 PROCEDURE — 99214 OFFICE O/P EST MOD 30 MIN: CPT | Performed by: INTERNAL MEDICINE

## 2025-02-11 ASSESSMENT — PATIENT HEALTH QUESTIONNAIRE - PHQ9
1. LITTLE INTEREST OR PLEASURE IN DOING THINGS: NOT AT ALL
SUM OF ALL RESPONSES TO PHQ9 QUESTIONS 1 AND 2: 0
2. FEELING DOWN, DEPRESSED OR HOPELESS: NOT AT ALL

## 2025-02-11 NOTE — PROGRESS NOTES
Consults    Reason For Consult  High prolactin     History Of Present Illness  Melyssa Howe is a 39 y.o. female presenting with high prolactin .       She has flu currently, she is resting in bed    Her main concern is wt gain 33 lbs she has been eating healthy she is very frustrated with her current increase weight  She never took metformin  She is asking about berberine     Eats healthy      She has headaches and eyes gets irritated  , and needed accomodation from bright light  , bright light cover            brief history:  she has    for prolactinoma breast tenderness  She had a very mild elevation of prolactin but very significant clinical findings that responded to cabergoline  She tolerates her dose  She is due for another MRI in a year  She has episodes of nausea dizziness and lightheadedness which requires blood testing to adjust her medications  She is currently working from home  I have not seen her for more than 1-1/2-year so she wanted to establish her care so that she can get her medications and imaging as well as her test     Any family history of thyroid cancer? no  Any personal history of radiation to your head/neck? no    Past Medical History  She has a past medical history of Benign neoplasm of pituitary gland (Multi) (12/13/2022) and Migraine, unspecified, not intractable, without status migrainosus.    Surgical History  She has a past surgical history that includes Other surgical history (11/24/2020); Other surgical history (11/24/2020); Other surgical history (11/24/2020); Other surgical history (11/24/2020); Pituitary surgery; [place] tooth extraction; and Hysterectomy.     Social History  She reports that she has never smoked. She has never used smokeless tobacco. She reports that she does not drink alcohol and does not use drugs.    Family History  Family History   Problem Relation Name Age of Onset    Osteoporosis Mother Janett     Rheum arthritis Mother Janett     Breast cancer Maternal  Grandmother Ziggy     Lung cancer Maternal Grandmother Ziggy     Stroke Maternal Grandmother Ziggy         Allergies  Penicillins, Amaris (28) [drospirenone-ethinyl estradiol], Sulfamethoxazole, and Sulfamethoxazole-trimethoprim    Review of Systems    Past Medical History:   Diagnosis Date    Benign neoplasm of pituitary gland (Multi) 12/13/2022    Prolactinoma    Migraine, unspecified, not intractable, without status migrainosus     Migraine       Past Surgical History:   Procedure Laterality Date    HYSTERECTOMY      OTHER SURGICAL HISTORY  11/24/2020    Hysterectomy    OTHER SURGICAL HISTORY  11/24/2020    Colonoscopy    OTHER SURGICAL HISTORY  11/24/2020    Endoscopy    OTHER SURGICAL HISTORY  11/24/2020    Shoulder surgery    PITUITARY SURGERY      WISDOM TOOTH EXTRACTION         Social History     Socioeconomic History    Marital status: Single     Spouse name: Not on file    Number of children: Not on file    Years of education: Not on file    Highest education level: Not on file   Occupational History    Not on file   Tobacco Use    Smoking status: Never    Smokeless tobacco: Never   Substance and Sexual Activity    Alcohol use: Never    Drug use: Never    Sexual activity: Yes     Partners: Male     Comment: Hysterectomy   Other Topics Concern    Not on file   Social History Narrative    Not on file     Social Drivers of Health     Financial Resource Strain: Patient Declined (10/15/2024)    Received from [facility]    Overall Financial Resource Strain (CARDIA)     Difficulty of Paying Living Expenses: Patient declined   Food Insecurity: Patient Declined (10/15/2024)    Received from [facility]    Hunger Vital Sign     Worried About Running Out of Food in the Last Year: Patient declined     Ran Out of Food in the Last Year: Patient declined   Transportation Needs: Unknown (10/15/2024)    Received from [facility]    PRAPARE - Transportation      "Lack of Transportation (Medical): Not on file     Lack of Transportation (Non-Medical): Patient declined   Physical Activity: Not on file   Stress: Not on file   Social Connections: Not on file   Intimate Partner Violence: Not on file   Housing Stability: Unknown (10/15/2024)    Received from [facility]    Housing Stability Vital Sign     Unable to Pay for Housing in the Last Year: Not on file     Number of Times Moved in the Last Year: Not on file     Homeless in the Last Year: Patient declined        Physical Exam     ROS, PMH, FH/SH, surgical history and allergies have been reviewed.    Last Recorded Vitals  Height 1.524 m (5'), weight 68.9 kg (152 lb).    Relevant Results         If you would like to pull in Medications, type .meds     If you would like to pull in Lab results for the last 24 hours, type .[id number]    If you would like to pull in specific Lab results, type .ll     If you would like to pull in Imaging results, type .imgrslt :99}  Lab Review  No results found for: \"BILITOT\", \"CALCIUM\", \"CO2\", \"CL\", \"CREATININE\", \"GLUCOSE\", \"ALKPHOS\", \"K\", \"PROT\", \"NA\", \"AST\", \"ALT\", \"BUN\", \"ANIONGAP\", \"MG\", \"PHOS\", \"GGT\", \"LDH\", \"ALBUMIN\", \"AMYLASE\", \"LIPASE\", \"GFRF\", \"GFRMALE\"  No results found for: \"TRIG\", \"CHOL\", \"LDLCALC\", \"HDL\"  No results found for: \"HGBA1C\"  The ASCVD Risk score (Craig ELIZABETH, et al., 2019) failed to calculate for the following reasons:    The systolic blood pressure is missing    Cannot find a previous HDL lab    Cannot find a previous total cholesterol lab       Assessment/Plan   Problem List Items Addressed This Visit    None  Visit Diagnoses         Codes    Prolactinoma (Multi)    -  Primary D35.2    Relevant Orders    Thyroxine, Free    Hemoglobin A1C    Prolactin        Excessive weight gain   She has high prolactin levels and microprolactinoma we have been following her and treating as needed with cabergoline  She is suffering influenza at this visit she was at " home and resting and we discussed hydration and rest  She also has been gaining excessive weight it is possible it is perimenopause and insulin resistance I am going to rule out low free T4 high cortisol as she does have microadenoma history  We also discussed weight training eating high-fiber carbohydrates and perhaps looking into Detox book by Arturo Helms      Once this time we will do her FMLA papers as well    I spent a total of 30+ minutes on the date of the service which included preparing to see the patient, face-to-face patient care, completing clinical documentation, obtaining and / or reviewing separately obtained history, counseling and educating the patient/family/caregiver, ordering medications, tests, or procedures, communicating with other healthcare providers (not separately reported), independently interpreting results, not separately reported, and communicating results to the patient/family/caregiver, real-time telemedicine visit using audiovisual technology with patient's verbal consent.  Name and date of birth verified.        Narciso Eisenberg MD

## 2025-05-29 ENCOUNTER — PATIENT MESSAGE (OUTPATIENT)
Dept: ENDOCRINOLOGY | Facility: CLINIC | Age: 40
End: 2025-05-29
Payer: COMMERCIAL

## 2025-05-29 ENCOUNTER — TELEPHONE (OUTPATIENT)
Dept: FAMILY MEDICINE CLINIC | Age: 40
End: 2025-05-29

## 2025-05-29 DIAGNOSIS — D35.2 PROLACTINOMA (MULTI): ICD-10-CM

## 2025-05-29 RX ORDER — CABERGOLINE 0.5 MG/1
0.5 TABLET ORAL 2 TIMES WEEKLY
Qty: 24 TABLET | Refills: 3 | Status: SHIPPED | OUTPATIENT
Start: 2025-05-29

## 2025-05-29 NOTE — TELEPHONE ENCOUNTER
----- Message from Nahomi ABEBE sent at 5/29/2025 12:36 PM EDT -----  Regarding: [facility] Appointment Request  [facility] Appointment Request    Patient needs appointment for [facility] Appointment Type: New to Provider.    Patient Requested Dates(s): as soon as possible  Patient Requested Time:  any time  Provider Name:  preferred Paulo Lynch    Reason for Appointment Request: Established Patient - No appointments available during search  --------------------------------------------------------------------------------------------------------------------------    Relationship to Patient: Self     Call Back Information: OK to leave message on voicemail  Preferred Call Back Number: Phone        Note: highly recommended by her family and friends, new to provider, transferring from [facility], lump on the left side on the rib cage

## 2025-05-30 LAB
ACTH PLAS-MCNC: NORMAL PG/ML
CORTIS SERPL-MCNC: NORMAL UG/DL
DHEA-S SERPL-MCNC: 157 MCG/DL (ref 19–237)
EST. AVERAGE GLUCOSE BLD GHB EST-MCNC: 111 MG/DL
EST. AVERAGE GLUCOSE BLD GHB EST-SCNC: 6.2 MMOL/L
HBA1C MFR BLD: 5.5 %
PROLACTIN SERPL-MCNC: <1 NG/ML
T4 FREE SERPL-MCNC: 1.1 NG/DL (ref 0.8–1.8)
TESTOST FREE SERPL-MCNC: NORMAL PG/ML
TESTOST SERPL-MCNC: NORMAL NG/DL

## 2025-06-02 LAB
ACTH PLAS-MCNC: 12 PG/ML (ref 6–50)
CORTIS SERPL-MCNC: NORMAL UG/DL
DHEA-S SERPL-MCNC: 157 MCG/DL (ref 19–237)
TESTOST FREE SERPL-MCNC: 1.1 PG/ML (ref 0.1–6.4)
TESTOST SERPL-MCNC: 17 NG/DL (ref 2–45)

## 2025-06-13 LAB
ACTH PLAS-MCNC: 12 PG/ML (ref 6–50)
CORTIS SERPL-MCNC: 7.5 MCG/DL
DHEA-S SERPL-MCNC: 157 MCG/DL (ref 19–237)
TESTOST FREE SERPL-MCNC: 1.1 PG/ML (ref 0.1–6.4)
TESTOST SERPL-MCNC: 17 NG/DL (ref 2–45)

## 2025-06-30 ENCOUNTER — HOSPITAL ENCOUNTER (OUTPATIENT)
Age: 40
Discharge: HOME | End: 2025-06-30
Payer: COMMERCIAL

## 2025-06-30 ENCOUNTER — TELEPHONE (OUTPATIENT)
Dept: ENDOCRINOLOGY | Facility: CLINIC | Age: 40
End: 2025-06-30

## 2025-06-30 LAB
ANION GAP SERPL CALCULATED.3IONS-SCNC: 12 MMOL/L (ref 7–16)
BUN SERPL-MCNC: 11 MG/DL (ref 6–20)
CALCIUM SERPL-MCNC: 8.9 MG/DL (ref 8.6–10)
CHLORIDE SERPL-SCNC: 104 MMOL/L (ref 98–107)
CO2 SERPL-SCNC: 24 MMOL/L (ref 22–29)
CORTIS SERPL-MCNC: 11.5 UG/DL (ref 2.7–18.4)
CREAT SERPL-MCNC: 0.7 MG/DL (ref 0.5–1)
GFR, ESTIMATED: >90 ML/MIN/1.73M2
GLUCOSE SERPL-MCNC: 100 MG/DL (ref 74–99)
POTASSIUM SERPL-SCNC: 4.4 MMOL/L (ref 3.5–5.1)
SODIUM SERPL-SCNC: 140 MMOL/L (ref 136–145)

## 2025-06-30 PROCEDURE — 80048 BASIC METABOLIC PNL TOTAL CA: CPT

## 2025-06-30 PROCEDURE — 36415 COLL VENOUS BLD VENIPUNCTURE: CPT

## 2025-06-30 PROCEDURE — 82533 TOTAL CORTISOL: CPT

## 2025-06-30 PROCEDURE — 82088 ASSAY OF ALDOSTERONE: CPT

## 2025-06-30 PROCEDURE — 82384 ASSAY THREE CATECHOLAMINES: CPT

## 2025-07-03 LAB
ALDOST SERPL-MCNC: 10.1 NG/DL
ALDOSTERONE COMMENT: NORMAL

## 2025-07-05 LAB
CATECHOLS PLAS-IMP: ABNORMAL
DOPAMINE SERPL-MCNC: <130 PMOL/L
EPINEPHRINE PLASMA: 122 PMOL/L
NOREPINEPHRINE: 461 PMOL/L (ref 1050–4800)

## 2025-11-25 ENCOUNTER — APPOINTMENT (OUTPATIENT)
Dept: ENDOCRINOLOGY | Facility: CLINIC | Age: 40
End: 2025-11-25
Payer: COMMERCIAL

## 2025-11-26 ENCOUNTER — APPOINTMENT (OUTPATIENT)
Dept: ENDOCRINOLOGY | Facility: CLINIC | Age: 40
End: 2025-11-26
Payer: COMMERCIAL